# Patient Record
Sex: MALE | Race: BLACK OR AFRICAN AMERICAN | Employment: UNEMPLOYED | URBAN - METROPOLITAN AREA
[De-identification: names, ages, dates, MRNs, and addresses within clinical notes are randomized per-mention and may not be internally consistent; named-entity substitution may affect disease eponyms.]

---
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
## 2018-02-28 ENCOUNTER — HOSPITAL ENCOUNTER (EMERGENCY)
Facility: CLINIC | Age: 18
Discharge: PSYCHIATRIC OR SUBSTANCE USE FACILITY | End: 2018-03-01
Attending: EMERGENCY MEDICINE | Admitting: EMERGENCY MEDICINE
Payer: MEDICAID

## 2018-02-28 DIAGNOSIS — R45.851 SUICIDAL IDEATION: ICD-10-CM

## 2018-02-28 PROCEDURE — 80320 DRUG SCREEN QUANTALCOHOLS: CPT | Performed by: EMERGENCY MEDICINE

## 2018-02-28 PROCEDURE — 99285 EMERGENCY DEPT VISIT HI MDM: CPT | Mod: 25

## 2018-02-28 PROCEDURE — 80048 BASIC METABOLIC PNL TOTAL CA: CPT | Performed by: EMERGENCY MEDICINE

## 2018-02-28 PROCEDURE — 80329 ANALGESICS NON-OPIOID 1 OR 2: CPT | Performed by: EMERGENCY MEDICINE

## 2018-02-28 PROCEDURE — 90791 PSYCH DIAGNOSTIC EVALUATION: CPT

## 2018-02-28 PROCEDURE — 85025 COMPLETE CBC W/AUTO DIFF WBC: CPT | Performed by: EMERGENCY MEDICINE

## 2018-02-28 RX ORDER — ERGOCALCIFEROL 1.25 MG/1
50000 CAPSULE, LIQUID FILLED ORAL
Status: ON HOLD | COMMUNITY
Start: 2018-01-21 | End: 2018-03-01

## 2018-02-28 RX ORDER — BUPROPION HYDROCHLORIDE 100 MG/1
100 TABLET, EXTENDED RELEASE ORAL
Status: ON HOLD | COMMUNITY
Start: 2018-01-15 | End: 2018-03-01

## 2018-02-28 RX ORDER — TRAZODONE HYDROCHLORIDE 50 MG/1
50 TABLET, FILM COATED ORAL
Status: ON HOLD | COMMUNITY
Start: 2018-01-15 | End: 2018-03-01

## 2018-03-01 ENCOUNTER — HOSPITAL ENCOUNTER (INPATIENT)
Facility: CLINIC | Age: 18
LOS: 1 days | Discharge: HOME | End: 2018-03-01
Attending: PSYCHIATRY & NEUROLOGY | Admitting: PSYCHIATRY & NEUROLOGY
Payer: MEDICAID

## 2018-03-01 VITALS
BODY MASS INDEX: 41.35 KG/M2 | DIASTOLIC BLOOD PRESSURE: 55 MMHG | TEMPERATURE: 98.2 F | RESPIRATION RATE: 20 BRPM | HEART RATE: 92 BPM | WEIGHT: 280 LBS | SYSTOLIC BLOOD PRESSURE: 130 MMHG | OXYGEN SATURATION: 95 %

## 2018-03-01 VITALS
WEIGHT: 291 LBS | HEIGHT: 68 IN | RESPIRATION RATE: 16 BRPM | DIASTOLIC BLOOD PRESSURE: 87 MMHG | BODY MASS INDEX: 44.1 KG/M2 | HEART RATE: 89 BPM | TEMPERATURE: 98.6 F | SYSTOLIC BLOOD PRESSURE: 148 MMHG | OXYGEN SATURATION: 95 %

## 2018-03-01 DIAGNOSIS — F51.01 PRIMARY INSOMNIA: Primary | ICD-10-CM

## 2018-03-01 PROBLEM — R45.851 SUICIDAL IDEATION: Status: ACTIVE | Noted: 2017-07-07

## 2018-03-01 LAB
AMPHETAMINES UR QL SCN: NEGATIVE
ANION GAP SERPL CALCULATED.3IONS-SCNC: 6 MMOL/L (ref 3–14)
APAP SERPL-MCNC: <2 MG/L (ref 10–20)
BARBITURATES UR QL: NEGATIVE
BASOPHILS # BLD AUTO: 0.1 10E9/L (ref 0–0.2)
BASOPHILS NFR BLD AUTO: 0.5 %
BENZODIAZ UR QL: NEGATIVE
BUN SERPL-MCNC: 16 MG/DL (ref 7–30)
CALCIUM SERPL-MCNC: 9 MG/DL (ref 8.5–10.1)
CANNABINOIDS UR QL SCN: NEGATIVE
CHLORIDE SERPL-SCNC: 105 MMOL/L (ref 94–109)
CO2 SERPL-SCNC: 27 MMOL/L (ref 20–32)
COCAINE UR QL: NEGATIVE
CREAT SERPL-MCNC: 0.94 MG/DL (ref 0.66–1.25)
DIFFERENTIAL METHOD BLD: ABNORMAL
EOSINOPHIL # BLD AUTO: 0.1 10E9/L (ref 0–0.7)
EOSINOPHIL NFR BLD AUTO: 0.8 %
ERYTHROCYTE [DISTWIDTH] IN BLOOD BY AUTOMATED COUNT: 12 % (ref 10–15)
ETHANOL SERPL-MCNC: <0.01 G/DL
GFR SERPL CREATININE-BSD FRML MDRD: >90 ML/MIN/1.7M2
GLUCOSE SERPL-MCNC: 98 MG/DL (ref 70–99)
HCT VFR BLD AUTO: 41.6 % (ref 40–53)
HGB BLD-MCNC: 14.1 G/DL (ref 13.3–17.7)
IMM GRANULOCYTES # BLD: 0 10E9/L (ref 0–0.4)
IMM GRANULOCYTES NFR BLD: 0.2 %
LYMPHOCYTES # BLD AUTO: 3.2 10E9/L (ref 0.8–5.3)
LYMPHOCYTES NFR BLD AUTO: 24.7 %
MCH RBC QN AUTO: 29.1 PG (ref 26.5–33)
MCHC RBC AUTO-ENTMCNC: 33.9 G/DL (ref 31.5–36.5)
MCV RBC AUTO: 86 FL (ref 78–100)
MONOCYTES # BLD AUTO: 0.9 10E9/L (ref 0–1.3)
MONOCYTES NFR BLD AUTO: 6.8 %
NEUTROPHILS # BLD AUTO: 8.6 10E9/L (ref 1.6–8.3)
NEUTROPHILS NFR BLD AUTO: 67 %
NRBC # BLD AUTO: 0 10*3/UL
NRBC BLD AUTO-RTO: 0 /100
OPIATES UR QL SCN: NEGATIVE
PCP UR QL SCN: NEGATIVE
PLATELET # BLD AUTO: 436 10E9/L (ref 150–450)
POTASSIUM SERPL-SCNC: 3.7 MMOL/L (ref 3.4–5.3)
RBC # BLD AUTO: 4.84 10E12/L (ref 4.4–5.9)
SALICYLATES SERPL-MCNC: <2 MG/DL
SODIUM SERPL-SCNC: 138 MMOL/L (ref 133–144)
WBC # BLD AUTO: 12.9 10E9/L (ref 4–11)

## 2018-03-01 PROCEDURE — 25000132 ZZH RX MED GY IP 250 OP 250 PS 637: Performed by: NURSE PRACTITIONER

## 2018-03-01 PROCEDURE — 12400001 ZZH R&B MH UMMC

## 2018-03-01 PROCEDURE — 99235 HOSP IP/OBS SAME DATE MOD 70: CPT | Performed by: PSYCHIATRY & NEUROLOGY

## 2018-03-01 PROCEDURE — 80307 DRUG TEST PRSMV CHEM ANLYZR: CPT | Performed by: EMERGENCY MEDICINE

## 2018-03-01 PROCEDURE — 99207 ZZC CDG-MDM COMPONENT: MEETS LOW - DOWN CODED: CPT | Performed by: PSYCHIATRY & NEUROLOGY

## 2018-03-01 RX ORDER — OLANZAPINE 5 MG/1
5-10 TABLET ORAL
Status: DISCONTINUED | OUTPATIENT
Start: 2018-03-01 | End: 2018-03-01 | Stop reason: HOSPADM

## 2018-03-01 RX ORDER — ALUMINA, MAGNESIA, AND SIMETHICONE 2400; 2400; 240 MG/30ML; MG/30ML; MG/30ML
30 SUSPENSION ORAL EVERY 4 HOURS PRN
Status: DISCONTINUED | OUTPATIENT
Start: 2018-03-01 | End: 2018-03-01 | Stop reason: HOSPADM

## 2018-03-01 RX ORDER — ACETAMINOPHEN 325 MG/1
650 TABLET ORAL EVERY 4 HOURS PRN
Status: DISCONTINUED | OUTPATIENT
Start: 2018-03-01 | End: 2018-03-01 | Stop reason: HOSPADM

## 2018-03-01 RX ORDER — TRAZODONE HYDROCHLORIDE 50 MG/1
50 TABLET, FILM COATED ORAL
Status: DISCONTINUED | OUTPATIENT
Start: 2018-03-01 | End: 2018-03-01 | Stop reason: HOSPADM

## 2018-03-01 RX ORDER — OLANZAPINE 10 MG/2ML
5-10 INJECTION, POWDER, FOR SOLUTION INTRAMUSCULAR
Status: DISCONTINUED | OUTPATIENT
Start: 2018-03-01 | End: 2018-03-01 | Stop reason: HOSPADM

## 2018-03-01 RX ORDER — HYDROXYZINE HYDROCHLORIDE 25 MG/1
25 TABLET, FILM COATED ORAL EVERY 4 HOURS PRN
Status: DISCONTINUED | OUTPATIENT
Start: 2018-03-01 | End: 2018-03-01 | Stop reason: HOSPADM

## 2018-03-01 RX ORDER — BISACODYL 10 MG
10 SUPPOSITORY, RECTAL RECTAL DAILY PRN
Status: DISCONTINUED | OUTPATIENT
Start: 2018-03-01 | End: 2018-03-01 | Stop reason: HOSPADM

## 2018-03-01 RX ORDER — TRAZODONE HYDROCHLORIDE 50 MG/1
50-100 TABLET, FILM COATED ORAL
Qty: 30 TABLET | Refills: 1 | Status: SHIPPED | OUTPATIENT
Start: 2018-03-01

## 2018-03-01 ASSESSMENT — ACTIVITIES OF DAILY LIVING (ADL)
GROOMING: SHOWER;INDEPENDENT;HANDWASHING
LAUNDRY: WITH SUPERVISION
ORAL_HYGIENE: INDEPENDENT
DRESS: STREET CLOTHES;SCRUBS (BEHAVIORAL HEALTH);INDEPENDENT

## 2018-03-01 ASSESSMENT — ENCOUNTER SYMPTOMS
AGITATION: 0
DYSPHORIC MOOD: 1

## 2018-03-01 NOTE — DISCHARGE INSTRUCTIONS
Behavioral Discharge Planning and Instructions      Summary:  You were admitted on 3/1/2018 as the EMS brought you because you posted a video on Facebook indiciating suicidal ideation..  You were treated by Dr. Vinh Shrestha MD. You were placed on a 72 hour hold.  You requested to be discharged home.  You had a psychiatric evaluation by Dr. Shrestha who assessed you as not being a danger to yourself.  You were discharged on 3/1/2018 from Station [facility] to Home . You were given a proof of hospitalization letter at your request.      Principal Diagnosis:   Adjustment Disorder with Depressed Mood    Health Care Follow-up Appointments:     You have health insurance through medical assistance.  Use this service for transportation to your health care appointments.  [facility] - - [phone] or the nurse line if need special accommodations [phone]      Attend your primary care appointment to establish care with a doctor and to keep up with your medication.  Dr. Coleman  [facility]  [address] [place] [address]  Phone: [phone]  Your provider can access your records through bead Button in [email or web address].  03/19/2018 Monday  2PM Office Visit FAMILY MEDICINE  [facility] Veda Coleman APRN,CNP    [address] [place] [address] [address] [place] [id number] [phone] (Fax)           You are ambivalent about receiving any therapy services. Here are some numbers in case you change your mind.  Ernesto March PsyD, LP  [facility]  [address] [facility] [address],   [place] [address] [phone]          Attend all scheduled appointments with your outpatient providers. Call at least 24 hours in advance if you need to reschedule an appointment to ensure continued access to your outpatient providers.   Major Treatments, Procedures and Findings:  You were provided with: a psychiatric assessment    Symptoms to Report: mood getting worse or thoughts of  suicide    Early warning signs can include: increased thoughts or behaviors of suicide or self-harm     Safety and Wellness:  Take all medicines as directed.  Make no changes unless your doctor suggests them.      Follow treatment recommendations.  Refrain from alcohol and non-prescribed drugs.  If there is a concern for safety, call 911.    Resources:     Sister: Christiano Mills [phone]    COPE (Community Outreach for Psychiatric Emergencies): 930.441.4456.Available 24-hours a day, 7 days a week. Call a COPE professional when a severe disturbance of mood or thinking is impacting your safety. COPE professionals are available to go where you are, handle an immediate crisis, and provide a clinical assessment. If needed, COPE will arrange an emergency evaluation for inpatient psychiatric services. Telephone consultations are also available. Ask them if you meet criteria for a crisis residence.   *You can also talk to [place] about crisis stabilization services if you are experiencing difficulty with the transition from the hospital.  [facility]  Acute Psychiatric Services/Crisis Intervention: [phone]  24-hour walk-in crisis intervention and treatment of behavioral emergencies    Located at:  [address] [facility] -  in the Emergency room on the first floor of the [facility]  [address] [place] [address]  Phone: [phone]  This is a crisis residence where you can stay for a short time if you feel like you need to stabilize but do not need to go to the hospital.    [facility]  [address] [place] [address]  Phone: [phone]        The treatment team has appreciated the opportunity to work with you.     If you have any questions or concerns our unit number is [phone].  You may be receiving a follow-up phone call within the next three days from a representative from  behavioral health.    You have identified the best phone number to reach you as [phone]

## 2018-03-01 NOTE — ED NOTES
Patient informed of 72 hour hold and plan to transfer to [facility], calm and cooperative. Patient will update his family.
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
## 2018-03-01 NOTE — PLAN OF CARE
Problem: Patient Care Overview  Goal: Plan of Care/Patient Progress Review  Outcome: Adequate for Discharge Date Met: 03/01/18  Illness Management Recovery model:  Feedback.    Patient identified the following people they would like to receive feedback from if/when they see early warning signs:    Friend(s few close friends    Family(s): parents , family    Partner/Spouse: no    Support Group Member(s): no    Co-Worker(s): no

## 2018-03-01 NOTE — IP AVS SNAPSHOT
30    [address]    [place] [address]    Phone:  [phone]                                       After Visit Summary   3/1/2018    Krzysztof Mills    MRN: [id number]           After Visit Summary Signature Page     I have received my discharge instructions, and my questions have been answered. I have discussed any challenges I see with this plan with the nurse or doctor.    ..........................................................................................................................................  Patient/Patient Representative Signature      ..........................................................................................................................................  Patient Representative Print Name and Relationship to Patient    ..................................................               ................................................  Date                                            Time    ..........................................................................................................................................  Reviewed by Signature/Title    ...................................................              ..............................................  Date                                                            Time

## 2018-03-01 NOTE — PLAN OF CARE
Problem: Depressive Symptoms  Goal: Depressive Symptoms  Signs and symptoms of listed problems will be absent or manageable.   Outcome: Adequate for Discharge Date Met: 03/01/18  Pt out in common areas 2/3 of shift. Pt denies any suicidal thoughts or self injurious thoughts or impulses. Pt denies suicidal intent yesterday,  Pt denies posting video of cutting self yesterday.  Pt denies having a face book account. Pt hopeful wants to return to work. Discharge instruction reviewed with pt. Pt is taking mnet ride home on phone arranging ride at time of charting.
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
## 2018-03-01 NOTE — DISCHARGE SUMMARY
"[facility] [place]  Combination psychiatric History and Physical and discharge summary      Patient name: Krzysztof Mills   MRN: [id number]    Age: 18 year old    YOB: 2000    Identifying information:   The patient is a 18 year old male    Chief complaint:  \"I am fine\"    History of present illness: The patient has a history of depression who was hospitalized at our facility last summer.  He was brought to the emergency room by EMS after a friend had contacted 911 reporting concern for depressed mood and suicidal thoughts.  He was placed under a 72 hour hold and transferred to the behavioral health unit.  Records in the emergency room mention that the patient had posted a video of himself cutting his wrists and claiming it was a suicide attempt.  The patient claimed that the report was inaccurate and that he had forwarded a music video to a friend.  The theme of the music video involve suicide and its aftereffects on friends and family.  He denied that he had recently cut his wrists.  There did not appear to be any lacerations on his wrists and there was no documentation in the emergency room that he was noted to have any lacerations or received any corresponding treatment.  He did discuss some stressors involving relations with his girlfriend.  This seemed to be an ongoing issue however he did not seem overly concerned about this today.  He continued to adamantly deny any suicidal ideations.  He did not report a sad mood however tolerable and chronically present.  He did not report any additional psychosocial stressors.    Psychiatric Review of Systems:    -- Depressive episode: Mood is sad, denied suicidal ideation, intensity of sadness is 4 on a scale of 0-10 with 10 being most severe, he denied neurovegetative symptoms of a depressive episode, denied anhedonia, denied feeling helpless or hopeless.  No homicidal thoughts reported.  -- Cary:  denies symptoms  -- " "Psychosis:  denies symptoms  -- Anxiety: denies symptoms corresponding to EIVTA or panic disorder  -- PTSD: denies symptoms  -- OCD: denies  symptoms  -- Eating disorder: denies symptoms    Psychiatric History:    Prior inpatient hospitalizations noted. Diagnoses have ranged between major depressive disorder and an adjustment disorder.  He reported a history of self-injurious behaviors through superficial cutting.  History of suicidal gestures reported through taking medications or threatening to jump out of a vehicle.  He currently does not have any outpatient mental health providers and is not taking any medications.    Substance Use History:    Denies using illicit substances or alcohol corresponding to a diagnosis of abuse or dependence. No prior chemical dependency treatments reported.    Medical History: No active issues      No current facility-administered medications on file prior to encounter.   No current outpatient prescriptions on file prior to encounter.     Social History:  Refer to the psychosocial assessment completed by our .     Family History:    The patient denied a family history of mental illnesses or suicide attempts    Medical review of systems: 10 systems were reviewed and positive for psychiatric symptoms as noted above otherwise negative    Physical Exam:    B/P: 148/87, T: 98.6, P: 89, R: 16  Estimated body mass index is 44.25 kg/(m^2) as calculated from the following:    Height as of this encounter: 1.727 m (5' 8\").    Weight as of this encounter: 132 kg (291 lb).    The rest of the physical examination was reviewed in the emergency room note completed by the emergency room physician.      Mental status examination:  Appearance:  Alert, fair hygiene, no acute distress  Attitude:  Attempts to be cooperative  Eye Contact: Fair  Mood:  \"okay\"  Affect: Mood congruent and blunted  Speech:  Normal rates, tone, latency, volume. Not pushed or pressured.  Psychomotor Behavior:  No " psychomotor abnormalities noted  Thought Process: Linear and logical; not tangential or circumstantial or disorganized  Associations:  Logical; no loose associations Noted  Thought Content:  No obvious paranoia, delusions, ideas of reference, or grandiosity noted. Denies auditory or visual hallucinations. Denies suicidal Ideations. Denies homicidal ideations.  Insight:  Fair  Judgment:  Fair  Oriented to:  time, person, and place  Attention Span and Concentration:  Intact  Recent and Remote Memory: Intact based on interviewing and details provided  Language: Appropriate based on interviewing  Fund of Knowledge: Appropriate based on interviewing  Muscle Strength and Tone: Normal upon visual inspection  Gait and Station: Normal upon visual inspection            Diagnoses:    Adjustment disorder with depressed mood    HOSPITAL COURSE: (Refer to H&P, progress notes, and consult notes for details)    On initial examination, the patient was requesting to be discharged home from the hospital.  Since his admission to the unit, he had been attending to self-care needs, slept well, ate meals, and cooperated with the assessments.  He was not hostile towards himself or others.  There did not appear to be any criteria to pursue involuntary commitment.  Since he was not interested in continuing hospitalization voluntarily, the patient maintain the right to be discharged home.  He did request to restart trazodone to help maintain sleep.  He was open to meeting with our  to gain resources for outpatient individual psychotherapy to help enhance coping skills.  He requested a letter to excuse him from work during his hospitalization and was provided this on discharge.    CONDITION AT DISCHARGE:  Improved.  The patients acute suicide risk is low due to the following factors:  improved mood/anxiety symptoms.  Denies suicidal ideations. Denies psychotic symptoms.  Not actively intoxicated and plans to abstain from illicit  substances and alcohol.  Denies access to guns.  Denies feeling hopeless or helpless. At the time of discharge Krzysztof Mills was determined to not be an immediate danger to herself or others. The patient's acute risk will be higher if noncompliant with treatment plan, medications, follow-up or using illicit substances or alcohol.  These findings along with the risks of noncompliance with medications and treatment plan, which could potentially cause decompensation and increase the risk for suicide, were discussed with the patient.  Preventative factors include: social supports, stable housing     DISPOSITION:  The patient is discharged home     FOLLOWUP APPOINTMENTS:  ( per social workers notes and after visit summary)  1. Primary care appointment through [facility] on March 19  2. He was provided with resources for individual psychotherapy through the [facility]    DISCHARGE MEDICATIONS:   Discharge Medication List as of 3/1/2018  3:35 PM      CONTINUE these medications which have CHANGED    Details   traZODone (DESYREL) 50 MG tablet Take 1-2 tablets ( mg) by mouth nightly as needed for sleep, Disp-30 tablet, R-1, E-Prescribe              LABORATORY RESULTS: (past 14 days)  No results found for this or any previous visit (from the past 336 hour(s)).

## 2018-03-01 NOTE — IP AVS SNAPSHOT
MRN:[id number]                      After Visit Summary   3/1/2018    Krzysztof Mills    MRN: [id number]           Thank you!     Thank you for choosing [facility] for your care. Our goal is always to provide you with excellent care.        Patient Information     Date Of Birth          2000        About your hospital stay     You were admitted on:  March 1, 2018 You last received care in the:  UR 30NR    You were discharged on:  March 1, 2018       Who to Call     For medical emergencies, please call 911.  For non-urgent questions about your medical care, please call your primary care provider or clinic, [phone]          Attending Provider     Provider Specialty    Vinh Shrestha MD Psychiatry       Primary Care Provider Office Phone # Fax #    [facility] [phone]      Further instructions from your care team          Behavioral Discharge Planning and Instructions      Summary:  You were admitted on 3/1/2018 as the EMS brought you because you posted a video on Facebook indiciating suicidal ideation..  You were treated by Dr. Vinh Shrestha MD. You were placed on a 72 hour hold.  You requested to be discharged home.  You had a psychiatric evaluation by Dr. Shrestha who assessed you as not being a danger to yourself.  You were discharged on 3/1/2018 from [facility] to Home . You were given a proof of hospitalization letter at your request.      Principal Diagnosis:   Adjustment Disorder with Depressed Mood    Health Care Follow-up Appointments:     You have health insurance through medical assistance.  Use this service for transportation to your health care appointments.  [facility] - - [phone] or the nurse line if need special accommodations [phone]      Attend your primary care appointment to establish care with a doctor and to keep up with your medication.  [facility]  [address] [place] [address]  [address]  Phone: [phone]  Your provider can access your records through [employer] in [employer].  03/19/2018 Monday  2PM Office Visit FAMILY MEDICINE  [facility] Veda Coleman APRN,CNP    [address] [address]    [place], [place] [address] [phone] (Fax)           You are ambivalent about receiving any therapy services. Here are some numbers in case you change your mind.  Ernesto March PsyD, LP  [facility]  [address] [facility] [address],   [place] [address] [phone]          Attend all scheduled appointments with your outpatient providers. Call at least 24 hours in advance if you need to reschedule an appointment to ensure continued access to your outpatient providers.   Major Treatments, Procedures and Findings:  You were provided with: a psychiatric assessment    Symptoms to Report: mood getting worse or thoughts of suicide    Early warning signs can include: increased thoughts or behaviors of suicide or self-harm     Safety and Wellness:  Take all medicines as directed.  Make no changes unless your doctor suggests them.      Follow treatment recommendations.  Refrain from alcohol and non-prescribed drugs.  If there is a concern for safety, call [phone].    Resources:     Sister: Christiano Mills [phone]    CRISTHIAN (Community Outreach for Psychiatric Emergencies): [phone].Available 24-hours a day, 7 days a week. Call a COPE professional when a severe disturbance of mood or thinking is impacting your safety. COPE professionals are available to go where you are, handle an immediate crisis, and provide a clinical assessment. If needed, CRISTHIAN will arrange an emergency evaluation for inpatient psychiatric services. Telephone consultations are also available. Ask them if you meet criteria for a crisis residence.   *You can also talk to CRISTHIAN about crisis stabilization services if you are experiencing difficulty with the transition from the  "hospital.  [facility] Acute Psychiatric Services  Acute Psychiatric Services/Crisis Intervention: [phone]  24-hour walk-in crisis intervention and treatment of behavioral emergencies    Located at:  o [address] [facility] -  in the Emergency room on the first floor of the [facility] Crisis [facility]idence  [address] [place] [address]  Phone: [phone]  This is a crisis residence where you can stay for a short time if you feel like you need to stabilize but do not need to go to the hospital.    [facility] Crisis Residence  [address] [place] [address]  Phone: [phone]        The treatment team has appreciated the opportunity to work with you.     If you have any questions or concerns our unit number is [phone].  You may be receiving a follow-up phone call within the next three days from a representative from behavioral health.    You have identified the best phone number to reach you as [phone]        Pending Results     No orders found from 2/27/2018 to 3/2/2018.            Admission Information     Date & Time Provider Department Dept. Phone    3/1/2018 Vinh Shrestha MD UR 30NR [phone]      Your Vitals Were     Blood Pressure Pulse Temperature Respirations Height Weight    148/87 89 98.6  F (37  C) 16 1.727 m (5' 8\") 132 kg (291 lb)    Pulse Oximetry BMI (Body Mass Index)                95% 44.25 kg/m2          [employer] Information     [employer] lets you send messages to your doctor, view your test results, renew your prescriptions, schedule appointments and more. To sign up, go to [email or web address] . Click on \"Log in\" on the left side of the screen, which will take you to the Welcome page. Then click on \"Sign up Now\" on the right side of the page.     You will be asked to enter the access code listed below, as well as some personal information. Please follow the directions to create your username and " password.     Your access code is: [email or web address]  Expires: 2018  3:02 PM     Your access code will  in 90 days. If you need help or a new code, please call your [facility] clinic or [phone].        Care EveryWhere ID     This is your Care EveryWhere ID. This could be used by other organizations to access your [facility] medical records  [id number]        Equal Access to Services     LONI GRANT : Hadii aad ku hadasho Soomaali, waaxda luqadaha, qaybta kaalmada adeegyada, waxay idiin hayaan adegriselda ruiz lajudy . So [facility] [phone].    ATENCIÓN: Si habla español, tiene a chauhan disposición servicios gratuitos de asistencia lingüística. Rachid al [phone].    We comply with applicable federal civil rights laws and [place] laws. We do not discriminate on the basis of race, color, national origin, age, disability, sex, sexual orientation, or gender identity.               Review of your medicines      CONTINUE these medicines which may have CHANGED, or have new prescriptions. If we are uncertain of the size of tablets/capsules you have at home, strength may be listed as something that might have changed.        Dose / Directions    traZODone 50 MG tablet   Commonly known as:  DESYREL   This may have changed:    - how much to take  - when to take this  - reasons to take this   Used for:  Primary insomnia        Dose:   mg   Take 1-2 tablets ( mg) by mouth nightly as needed for sleep   Quantity:  30 tablet   Refills:  1            Where to get your medicines      These medications were sent to [facility] [place], [place] - [address]  [address] [place] [address], [facility] [address]     Phone:  [phone]     traZODone 50 MG tablet                Protect others around you: Learn how to safely use, store and throw away your medicines at www.disposemymeds.org.             Medication List: This is a list of all your medications and when to take them. Check marks below indicate  your daily home schedule. Keep this list as a reference.      Medications           Morning Afternoon Evening Bedtime As Needed    traZODone 50 MG tablet   Commonly known as:  DESYREL   Take 1-2 tablets ( mg) by mouth nightly as needed for sleep

## 2018-03-01 NOTE — PROGRESS NOTES
Behavioral Discharge Planning and Instructions      Summary:  You were admitted on 3/1/2018 as the EMS brought you because you posted a video on Facebook indiciating suicidal ideation..  You were treated by Dr. Vinh Shrestha MD. You were placed on a 72 hour hold.  You requested to be discharged home.  You had a psychiatric evaluation by Dr. Shrestha who assessed you as not being a danger to yourself.  You were discharged on 3/1/2018 from [facility] Home . You were given a proof of hospitalization letter at your request.      Principal Diagnosis:   Adjustment Disorder with Depressed Mood    Health Care Follow-up Appointments:     You have health insurance through medical assistance.  Use this service for transportation to your health care appointments.  [facility] - - [phone] or the nurse line if need special accommodations [phone]      Attend your primary care appointment to establish care with a doctor and to keep up with your medication.  Dr. Coleman  [facility]  [address] [place] [address]  Phone: [phone]  Your provider can access your records through [employer] in [employer].  03/19/2018 Monday  2PM Office Visit FAMILY MEDICINE  [facility] [place] [facility] Veda Coleman APRN,CNP    [address] [place] [address] [address] [place] [id number] [phone] (Fax)           You are ambivalent about receiving any therapy services. Here are some numbers in case you change your mind.  Ernesto March PsyD, LP  [facility]  [address] [facility] [address],   [place] [address] [phone]          Attend all scheduled appointments with your outpatient providers. Call at least 24 hours in advance if you need to reschedule an appointment to ensure continued access to your outpatient providers.   Major Treatments, Procedures and Findings:  You were provided with: a psychiatric assessment    Symptoms to Report: mood getting worse or thoughts of  suicide    Early warning signs can include: increased thoughts or behaviors of suicide or self-harm     Safety and Wellness:  Take all medicines as directed.  Make no changes unless your doctor suggests them.      Follow treatment recommendations.  Refrain from alcohol and non-prescribed drugs.  If there is a concern for safety, call 911.    Resources:     Sister: Christiano Mills [phone]    COPE (Community Outreach for Psychiatric Emergencies): [phone].Available 24-hours a day, 7 days a week. Call a COPE professional when a severe disturbance of mood or thinking is impacting your safety. COPE professionals are available to go where you are, handle an immediate crisis, and provide a clinical assessment. If needed, COPE will arrange an emergency evaluation for inpatient psychiatric services. Telephone consultations are also available. Ask them if you meet criteria for a crisis residence.   *You can also talk to [facility] about crisis stabilization services if you are experiencing difficulty with the transition from the hospital.  [facility] Acute Psychiatric Services  Acute Psychiatric Services/Crisis Intervention: [phone]  24-hour walk-in crisis intervention and treatment of behavioral emergencies    Located at:  o [address] [facility] -  in the Emergency room on the first floor of the [facility] Crisis Residence  [address] [place] [address]  Phone: [phone]  This is a crisis residence where you can stay for a short time if you feel like you need to stabilize but do not need to go to the hospital.    [facility] Residence  [address] [place] [address]  Phone: [phone]        The treatment team has appreciated the opportunity to work with you.     If you have any questions or concerns our unit number is [phone].  You may be receiving a follow-up phone call within the next three days from a representative from  behavioral health.    You have identified the best phone number to reach you as [phone]

## 2018-03-01 NOTE — PLAN OF CARE
Problem: Depressive Symptoms  Goal: Depressive Symptoms  Signs and symptoms of listed problems will be absent or manageable.   Outcome: Adequate for Discharge Date Met: 03/01/18  Pt to discharge this shift. Pt reports satisfaction with this plan.   Denies suicidal ideation, self-harm thoughts, and homicidal ideation. Denies AH/VH.  Discharge medications and AVS reviewed with pt who reports understanding. Plans to follow-up with outpt providers and appts scheduled on AVS.  Belongings sent with pt. Medications sent with patient Being transported by [facility] cab